# Patient Record
Sex: MALE | Race: WHITE
[De-identification: names, ages, dates, MRNs, and addresses within clinical notes are randomized per-mention and may not be internally consistent; named-entity substitution may affect disease eponyms.]

---

## 2018-05-16 ENCOUNTER — HOSPITAL ENCOUNTER (EMERGENCY)
Dept: HOSPITAL 25 - UCCORT | Age: 53
Discharge: HOME | End: 2018-05-16
Payer: COMMERCIAL

## 2018-05-16 VITALS — SYSTOLIC BLOOD PRESSURE: 110 MMHG | DIASTOLIC BLOOD PRESSURE: 68 MMHG
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
## 2018-05-16 DIAGNOSIS — S61.411A: Primary | ICD-10-CM
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
## 2018-05-16 PROCEDURE — 99202 OFFICE O/P NEW SF 15 MIN: CPT

## 2018-05-16 PROCEDURE — G0463 HOSPITAL OUTPT CLINIC VISIT: HCPCS

## 2018-05-16 PROCEDURE — 12002 RPR S/N/AX/GEN/TRNK2.6-7.5CM: CPT

## 2018-05-16 NOTE — UC
Laceration HPI





- HPI Summary


HPI Summary: 





Pt c/o laceratio to palm of right hand.  Pt reports that he was picking up 

metal shovel from yard and cut hand on edge of shovel.  Pt had tetanus 

vaccination in April 2018. 





- History Of Current Complaint


Chief Complaint: UCLaceration


Stated Complaint: LACERATION RIGHT HAND


Time Seen by Provider: 05/16/18 19:44


Hx Obtained From: Patient


Laceration Location: Hand


Mechanism Of Injury: Sharp Trauma


Onset/Duration: Sudden Onset


Severity: Mild


Pain Intensity: 1


Aggravating Factors: Position, Movement


Related History: Dominant Hand Right





- Allergies/Home Medications


Allergies/Adverse Reactions: 


 Allergies











Allergy/AdvReac Type Severity Reaction Status Date / Time


 


No Known Allergies Allergy   Verified 05/16/18 19:41














PMH/Surg Hx/FS Hx/Imm Hx


Previously Healthy: Yes





- Surgical History


Surgical History: None





- Family History


Known Family History: Positive: Cardiac Disease





- Social History


Occupation: Employed Full-time


Lives: With Family


Alcohol Use: Occasionally


Substance Use Type: None


Smoking Status (MU): Never Smoked Tobacco


Have You Smoked in the Last Year: No





- Immunization History


Most Recent Tetanus Shot: April 2018





Review of Systems


Constitutional: Negative


Skin: Other - laceration


Eyes: Negative


ENT: Negative


Respiratory: Negative


Cardiovascular: Negative


Gastrointestinal: Negative


Genitourinary: Negative


Motor: Negative


Neurovascular: Negative


Musculoskeletal: Negative


Neurological: Negative


Psychological: Negative


Is Patient Immunocompromised?: No


All Other Systems Reviewed And Are Negative: Yes





Physical Exam


Triage Information Reviewed: Yes


Appearance: Well-Appearing


Vital Signs: 


 Initial Vital Signs











Temp  98.8 F   05/16/18 19:42


 


Pulse  82   05/16/18 19:42


 


Resp  18   05/16/18 19:42


 


BP  110/68   05/16/18 19:42


 


Pulse Ox  97   05/16/18 19:42











Vital Signs Reviewed: Yes


Eye Exam: Normal


ENT Exam: Normal


Dental Exam: Normal


Neck exam: Normal


Respiratory Exam: Normal


Respiratory: Positive: No respiratory distress


Musculoskeletal Exam: Normal


Musculoskeletal: Positive: Strength Intact, ROM Intact


Neurological Exam: Normal


Psychological Exam: Normal


Skin Exam: Other - laceration palm of right hand at base of thumb





Laceration Repair





- Laceration Repair


  ** 1


Description: Linear


Laceration Size After Repair: Length (cm) - 3.5, Width (mm) - 4, Depth (mm) - 3


Modified For Repair: No


Type Injection: Local


Anesthesia Used: 1.0% Lido


Cleansing Completed Via Routine Prep: Yes


Closure Method: Single Layer - 11 sutures of 4-0


Suture Of: Skin





Laceration Course/Dx





- Course/Dx


Course Of Treatment: 11 sutures of 4-0 placed. edges well approximated.  

tetanus UTD





- Differential Dx - Laceration/Wound


Differental Diagnoses: Laceration


Provider Diagnoses: laceration repair right palm of hand.  11 sutures of 4-0





Discharge





- Sign-Out/Discharge


Documenting (check all that apply): Discharge/Admit/Transfer





- Discharge Plan


Condition: Stable


Disposition: HOME


Prescriptions: 


Cephalexin CAP* [Keflex 500 CAP*] 500 mg PO Q12H #10 cap


Patient Education Materials:  Laceration (ED)


Referrals: 


Gabby Rabago [Primary Care Provider] - If Needed


Additional Instructions: 


Please return in 8-10 days for suture removal.  Please monitor for signs and 

symptoms of infection such as increased tenderness, redness, swelling, purulent 

discharge and fever or chills. 





- Billing Disposition and Condition


Condition: STABLE


Disposition: HOME

## 2018-05-26 ENCOUNTER — HOSPITAL ENCOUNTER (EMERGENCY)
Dept: HOSPITAL 25 - UCCORT | Age: 53
Discharge: HOME | End: 2018-05-26
Payer: COMMERCIAL

## 2018-05-26 VITALS — SYSTOLIC BLOOD PRESSURE: 111 MMHG | DIASTOLIC BLOOD PRESSURE: 69 MMHG
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
## 2018-05-26 DIAGNOSIS — S61.411D: Primary | ICD-10-CM

## 2018-05-26 NOTE — UC
HPI Wound/Suture Re-check





- HPI Summary


HPI Summary: 





laceration to palm of right hand 10 days ago her for suture removal, tetanus up 

to date, tolerated antibiodic well. was been working covering wound with 

dressing





- History Of Current Complaint


Chief Complaint: UCLaceration


Stated Complaint: STITCH REMOVAL - RT HAND


Time Seen by Provider: 05/26/18 08:52


Hx Obtained From: Patient


Onset/Duration: Sudden Onset, Lasting Days - 10, Resolved


Pain Intensity: 0


Pain Scale Used: 0-10 Numeric


Procedure Type: Laceration repair palm of right hand


Surgery Date: 05/16/18





- Allergies/Home Medications


Allergies/Adverse Reactions: 


 Allergies











Allergy/AdvReac Type Severity Reaction Status Date / Time


 


No Known Allergies Allergy   Verified 05/26/18 08:50











Home Medications: 


 Home Medications





NK [No Home Medications Reported]  05/26/18 [History Confirmed 05/26/18]











PMH/Surg Hx/FS Hx/Imm Hx


Previously Healthy: Yes





- Surgical History


Surgical History: None





- Family History


Known Family History: Positive: Cardiac Disease





- Social History


Occupation: Employed Full-time


Lives: With Family


Alcohol Use: Occasionally


Substance Use Type: None


Smoking Status (MU): Former Smoker


Type: Smokeless Tobacco


Length of Time of Smoking/Using Tobacco: 2 dips/day for ~25 Years


Have You Smoked in the Last Year: No


When Did the Patient Quit Smoking/Using Tobacco: 2016





- Immunization History


Most Recent Tetanus Shot: April 2018





Review of Systems


Constitutional: Negative


Skin: Other - healing wound right hand


Eyes: Negative


ENT: Negative


Respiratory: Negative


Cardiovascular: Negative


Gastrointestinal: Negative


Genitourinary: Negative


Motor: Negative


Neurovascular: Negative


Musculoskeletal: Negative


Neurological: Negative


Psychological: Negative


Is Patient Immunocompromised?: No


All Other Systems Reviewed And Are Negative: Yes





Physical Exam


Triage Information Reviewed: Yes


Appearance: Well-Appearing, No Pain Distress, Well-Nourished


Vital Signs: 


 Initial Vital Signs











Temp  98.6 F   05/26/18 08:48


 


Pulse  80   05/26/18 08:48


 


Resp  16   05/26/18 08:48


 


BP  111/69   05/26/18 08:48


 


Pulse Ox  97   05/26/18 08:48











Vital Signs Reviewed: Yes


Eye Exam: Normal


Eyes: Positive: Conjunctiva Clear


ENT Exam: Normal


ENT: Positive: Normal ENT inspection, Hearing grossly normal.  Negative: Nasal 

congestion, Trismus, Muffled voice, Hoarse voice


Dental Exam: Normal


Neck exam: Normal


Neck: Positive: Supple, Nontender


Respiratory Exam: Normal


Respiratory: Positive: No respiratory distress, No accessory muscle use


Cardiovascular Exam: Normal


Cardiovascular: Positive: RRR, Pulses Normal, Brisk Capillary Refill


Musculoskeletal Exam: Normal


Musculoskeletal: Positive: Strength Intact, ROM Intact, No Edema


Neurological Exam: Normal


Neurological: Positive: Alert, Muscle Tone Normal


Psychological Exam: Normal


Skin Exam: Other


Skin: Positive: Other - healing wound palm of right hand





Course/Dx





- Course


Course Of Treatment: sutures removed ---wound well approximated, steri strips 

with benzoin applied





- Differential Dx - Laceration/Wound


Provider Diagnoses: suture removal healing wound palm of right hand





Discharge





- Sign-Out/Discharge


Documenting (check all that apply): Discharge/Admit/Transfer





- Discharge Plan


Condition: Stable


Disposition: HOME


Patient Education Materials:  Skin Adhesive Care (ED), Stitches Removal (ED)


Referrals: 


Gabby Rabago [Primary Care Provider] - If Needed





- Billing Disposition and Condition


Condition: STABLE


Disposition: HOME